# Patient Record
Sex: MALE | URBAN - NONMETROPOLITAN AREA
[De-identification: names, ages, dates, MRNs, and addresses within clinical notes are randomized per-mention and may not be internally consistent; named-entity substitution may affect disease eponyms.]

---

## 2023-10-10 ENCOUNTER — TELEPHONE (OUTPATIENT)
Dept: UROLOGY | Facility: CLINIC | Age: 73
End: 2023-10-10

## 2023-10-10 RX ORDER — TAMSULOSIN HYDROCHLORIDE 0.4 MG/1
CAPSULE ORAL
COMMUNITY
Start: 2023-04-28

## 2023-10-10 RX ORDER — ROSUVASTATIN CALCIUM 40 MG/1
1 TABLET, COATED ORAL
COMMUNITY
Start: 2023-07-13 | End: 2024-01-09

## 2023-10-10 RX ORDER — DOXYCYCLINE HYCLATE 100 MG/1
100 CAPSULE ORAL 2 TIMES DAILY
COMMUNITY

## 2023-10-10 RX ORDER — ASPIRIN 81 MG/1
81 TABLET ORAL EVERY OTHER DAY
COMMUNITY

## 2023-10-10 RX ORDER — SODIUM CHLORIDE 9 MG/ML
10 INJECTION, SOLUTION INTRAVENOUS
COMMUNITY
Start: 2023-03-03 | End: 2024-06-01

## 2023-10-10 NOTE — TELEPHONE ENCOUNTER
Patient returned call with PSA number. He said his most recent one was 3.86 and he will bring a print out for his file. He had it done at [facility].

## 2023-10-10 NOTE — TELEPHONE ENCOUNTER
Call placed to pt to see if he had any recent PSA testing. Please find out if pt can have PSA prior to appt if not ok to wait until appt. If pt decides to have testing done please find out where pt prefers to have order faxed.

## 2023-10-15 PROBLEM — N40.1 BPH ASSOCIATED WITH NOCTURIA: Status: ACTIVE | Noted: 2023-10-15

## 2023-10-15 PROBLEM — R35.1 BPH ASSOCIATED WITH NOCTURIA: Status: ACTIVE | Noted: 2023-10-15

## 2023-10-16 ENCOUNTER — OFFICE VISIT (OUTPATIENT)
Dept: UROLOGY | Facility: CLINIC | Age: 73
End: 2023-10-16
Payer: MEDICARE

## 2023-10-16 VITALS
SYSTOLIC BLOOD PRESSURE: 130 MMHG | HEART RATE: 49 BPM | HEIGHT: 71 IN | DIASTOLIC BLOOD PRESSURE: 84 MMHG | OXYGEN SATURATION: 94 % | TEMPERATURE: 97.1 F | WEIGHT: 168.2 LBS | BODY MASS INDEX: 23.55 KG/M2

## 2023-10-16 DIAGNOSIS — R35.1 BPH ASSOCIATED WITH NOCTURIA: Primary | ICD-10-CM

## 2023-10-16 DIAGNOSIS — R97.20 ELEVATED PSA: ICD-10-CM

## 2023-10-16 DIAGNOSIS — N40.1 BPH ASSOCIATED WITH NOCTURIA: Primary | ICD-10-CM

## 2023-10-16 PROCEDURE — 99213 OFFICE O/P EST LOW 20 MIN: CPT | Performed by: UROLOGY

## 2023-10-16 RX ORDER — AMOXICILLIN 500 MG/1
CAPSULE ORAL
COMMUNITY
Start: 2023-08-28

## 2023-10-16 NOTE — PROGRESS NOTES
UROLOGY PROGRESS NOTE         NAME: Benny Echeverria  AGE: 68 y.o. SEX: male  : 1950   MRN: [id number]    DATE: 10/16/2023  TIME: 10:50 AM    Assessment and Plan      Impression:   1. BPH associated with nocturia    2. Elevated PSA  -     PSA Total, Diagnostic; Future       Plan: Very well on Flomax 0.4 mg daily. His recent PSA was excellent. This is the lowest PSA he has had in a few years. It was satisfactory and unchanged. Were going to see him back in 2 years. It is a long drive for him I am going to ask his PCP to do an annual PSA for us. Back sooner if he develops new symptoms. Presently he has mild nocturia and occasional rare urgency. Chief Complaint     Chief Complaint   Patient presents with   • Benign Prostatic Hypertrophy     History of Present Illness     HPI: Benny Echeverria is a 68y.o. year old male who presents with a history of BPH. Patient was previously seen at [facility] last year. PSA was normal at 3.29 this year. Patient on tamsulosin. .  Patient doing well. He has nocturia x1 and rare double void in the morning. No bothersome symptoms presently and no side effects from the tamsulosin. The following portions of the patient's history were reviewed and updated as appropriate: allergies, current medications, past family history, past medical history, past social history, past surgical history and problem list.  Past Medical History:   Diagnosis Date   • BPH (benign prostatic hypertrophy) 2022   • Coronary artery disease 2021     Past Surgical History:   Procedure Laterality Date   • AORTIC VALVE REPAIR       shoulder  Review of Systems     Const: Denies chills, fever and weight loss. CV: Denies chest pain. Resp: Denies SOB. GI: Denies abdominal pain, nausea and vomiting. : Denies symptoms other than stated above. Musculo: Denies back pain.     Objective   /84 (BP Location: Left arm, Patient Position: Sitting, Cuff Size: Adult)   Pulse (!) 49   Temp (!) 97.1 °F (36.2 °C)   Ht 5' 11" (1.803 m)   Wt 76.3 kg (168 lb 3.2 oz)   SpO2 94%   BMI 23.46 kg/m²     Physical Exam  Const: Appears healthy and well developed. No signs of acute distress present. Resp: Respirations are regular and unlabored. CV: Rate is regular. Rhythm is regular. Abdomen: Abdomen is soft, nontender, and nondistended. Kidneys are not palpable. : Prostate is 2-3+ smooth soft and nontender. There are no nodules. There is no rectal mass. Psych: Patient's attitude is cooperative.  Mood is normal. Affect is normal.    Procedure   Procedures     Current Medications     Current Outpatient Medications:   •  amoxicillin (AMOXIL) 500 mg capsule, TAKE 4 CAPSULES BY MOUTH ONE HOUR PRIOR TO DENTAL APPOINTMENT, Disp: , Rfl:   •  aspirin (ECOTRIN LOW STRENGTH) 81 mg EC tablet, Take 81 mg by mouth every other day, Disp: , Rfl:   •  rosuvastatin (CRESTOR) 40 MG tablet, Take 1 tablet by mouth daily at bedtime, Disp: , Rfl:   •  tamsulosin (FLOMAX) 0.4 mg, , Disp: , Rfl:   •  doxycycline hyclate (VIBRAMYCIN) 100 mg capsule, Take 100 mg by mouth 2 (two) times a day (Patient not taking: Reported on 10/16/2023), Disp: , Rfl:   •  Perflutren Lipid Microsphere (DEFINITY IV), Inject into a catheter in a vein (Patient not taking: Reported on 10/16/2023), Disp: , Rfl:   •  sodium chloride, Inject 10 mL into a catheter in a vein (Patient not taking: Reported on 10/16/2023), Disp: , Rfl:         Mary Gotti MD